# Patient Record
Sex: MALE | Race: WHITE
[De-identification: names, ages, dates, MRNs, and addresses within clinical notes are randomized per-mention and may not be internally consistent; named-entity substitution may affect disease eponyms.]

---

## 2018-06-28 ENCOUNTER — HOSPITAL ENCOUNTER (OUTPATIENT)
Dept: HOSPITAL 25 - OR | Age: 38
Setting detail: OBSERVATION
Discharge: HOME | End: 2018-06-28
Attending: NEUROLOGICAL SURGERY | Admitting: NEUROLOGICAL SURGERY
Payer: COMMERCIAL

## 2018-06-28 VITALS — SYSTOLIC BLOOD PRESSURE: 138 MMHG | DIASTOLIC BLOOD PRESSURE: 85 MMHG

## 2018-06-28 DIAGNOSIS — Z79.899: ICD-10-CM

## 2018-06-28 DIAGNOSIS — M51.16: ICD-10-CM

## 2018-06-28 DIAGNOSIS — Z88.0: ICD-10-CM

## 2018-06-28 DIAGNOSIS — M51.26: Primary | ICD-10-CM

## 2018-06-28 DIAGNOSIS — F17.210: ICD-10-CM

## 2018-06-28 PROCEDURE — 01NB0ZZ RELEASE LUMBAR NERVE, OPEN APPROACH: ICD-10-PCS | Performed by: NEUROLOGICAL SURGERY

## 2018-06-28 PROCEDURE — 0SB20ZZ EXCISION OF LUMBAR VERTEBRAL DISC, OPEN APPROACH: ICD-10-PCS | Performed by: NEUROLOGICAL SURGERY

## 2018-06-28 PROCEDURE — G0378 HOSPITAL OBSERVATION PER HR: HCPCS

## 2018-06-28 PROCEDURE — 88304 TISSUE EXAM BY PATHOLOGIST: CPT

## 2018-06-28 PROCEDURE — 72100 X-RAY EXAM L-S SPINE 2/3 VWS: CPT

## 2018-06-28 RX ADMIN — FENTANYL CITRATE PRN MCG: 0.05 INJECTION, SOLUTION INTRAMUSCULAR; INTRAVENOUS at 11:04

## 2018-06-28 RX ADMIN — FENTANYL CITRATE PRN MCG: 0.05 INJECTION, SOLUTION INTRAMUSCULAR; INTRAVENOUS at 10:56

## 2018-06-28 RX ADMIN — HYDROCODONE BITARTRATE AND ACETAMINOPHEN PRN TAB: 5; 325 TABLET ORAL at 14:39

## 2018-06-28 RX ADMIN — HYDROCODONE BITARTRATE AND ACETAMINOPHEN PRN TAB: 5; 325 TABLET ORAL at 11:16

## 2018-06-28 RX ADMIN — FENTANYL CITRATE PRN MCG: 0.05 INJECTION, SOLUTION INTRAMUSCULAR; INTRAVENOUS at 11:48

## 2018-06-28 RX ADMIN — FENTANYL CITRATE PRN MCG: 0.05 INJECTION, SOLUTION INTRAMUSCULAR; INTRAVENOUS at 11:18

## 2018-06-28 NOTE — PN
Progress Note





- Progress Note


Date of Service: 06/28/18


SOAP: 


Subjective:


[S/p lumbar discectomy L4-5 right, POD #0.


Feeling well, pre-op RLE pain resolved. 


Low back pain controlled with PO medication.


Ambulating independently. 


Eating, drinking and voiding.


Denies nausea, vomiting, headache. 


Would like to be discharged home today.]








Objective:


[Vital Signs:








Temp Pulse Resp BP Pulse Ox


 


 98.2 F   90   19   121/69   93 


 


 06/28/18 11:45  06/28/18 11:45  06/28/18 11:48  06/28/18 11:45  06/28/18 11:45








General: Alert and no distress. Laying comfortably in bed. 


Neuro: Motor and sensory intact.


Incision: Intact with staples. No swelling. Dressing dry and intact.]








Assessment:


[Stable post-op. Pain well controlled with PO meds. 








Plan:


[1. Discharge home.


2. Discharge instructions discussed. ]

## 2018-06-28 NOTE — RAD
Indication: L4-L5 discectomy.



Comparison: June 07, 2018 MRI.



Technique: Prone crosstable lateral lumbar sacral spine radiographs obtained on 955 and

1030 hours



REPORT AND IMPRESSION: 

#.  0955 hours exam documents instrument at the level of the inferior margin of the L4-L5

facet joints. 

#.  1030 hours exam documents and instrument at the level of the L4-L5 intervertebral

disc.

## 2018-06-30 NOTE — OP
OPERATIVE REPORT:

 

DATE OF OPERATION:  06/28/18.

 

DATE OF BIRTH:  [birth year].

 

SURGEON:  Jorge Whiteside MD.

 

FIRST ASSISTANT:  MICHELLE Luis.

 

ANESTHESIA:  General.

 

PRE-OP DIAGNOSIS:  Herniated nucleus pulposus, L4-5, on the right.

 

POST-OP DIAGNOSIS:  Herniated nucleus pulposus, L4-5, on the right.

 

OPERATIVE PROCEDURE:  Lumbar diskectomy, L4-5, on the right with microdissection.

 

DESCRIPTION OF PROCEDURE:  After satisfactory general anesthesia was obtained, the patient was placed
 on the operating table in the prone position with the chest supported on the Reyanldo frame and the ba
ck slightly flexed.  The lumbar region was then clipped, prepped, and draped in a sterile manner for 
lumbar laminectomy and a skin incision outlined from L4 to L5. This incision was infiltrated with 1% 
Xylocaine with epinephrine, after which it was turned down sharply to the level of the lumbar fascia.
  The fascia was divided along the spinous processes of L4 and L5 and a paraspinal musculature was st
ripped away from these posterior elements using the periosteal elevator and monopolar cautery.  An in
traoperative x-ray was obtained verifying proper interspace localization after which a partial hemila
minectomy was carried out by removing the inferior aspect of the L4 lamina and medial aspect of the f
acet complex with a combination of the Midas Mk drill and Kerrison rongeurs.  This was carried super
iorly until the attachment of ligamentum flavum was taken down.  Ligamentum flavum was then removed w
ith the Kerrison as well.  Additional lateral exposure was obtained as preoperative imaging had sugge
sted a superolateral disk herniation.  Utilizing microdissection, epidural venous structures were coa
gulated and divided.  Projecting out laterally was a subcapsular herniation and disk material.  An op
ening was made in the posterior longitudinal ligament and the disk space cleared of any loose disk ma
terial using the pituitary forceps and curettes.  This included several pieces projecting out into th
e foramen.  At the conclusion of decompression, both the L4 and L5 nerve roots were noted to be free 
in their course.  After assuring adequate hemostasis, wound was thoroughly irrigated, after which the
 fascia was reapproximated with 0 Vicryl suture, the subcutaneous tissues closed with 3-0 Vicryl sutu
re, and the skin closed with skin clips.  The estimated blood loss was less than 50 mL and the final 
sponge, padding, and needle counts were correct.  The patient was taken to the recovery room, extubat
ed, and in stable condition.

 

 [id number] #: [id number]